# Patient Record
(demographics unavailable — no encounter records)

---

## 2018-05-24 RX ORDER — METFORMIN HCL 500 MG
1000 TABLET, EXTENDED RELEASE 24 HR ORAL 2 TIMES DAILY WITH MEALS
Qty: 120 TABLET | Status: CANCELLED | OUTPATIENT
Start: 2018-05-24

## 2018-05-24 NOTE — TELEPHONE ENCOUNTER
Patient has blank chart in Epic, with no updates available or information to reconcile. Patient has no chart in [facility].  Iris Parsons RN .............. 5/24/2018  4:34 PM

## 2018-05-31 RX ORDER — METFORMIN HCL 500 MG
1000 TABLET, EXTENDED RELEASE 24 HR ORAL
COMMUNITY
Start: 2018-05-26

## 2018-05-31 NOTE — TELEPHONE ENCOUNTER
Metformin failed [facility] refill protocol due to no LDL or Microalbumin on file in the past 12 months and no BP, A1C or OV in the past 6 months.    Also, Patient has no PCP listed and appears to be going to [facility] for care.     Rx refused at this time. Called [facility] pharmacy and spoke with Erasmo, after verifying Patient's last name and . He states this request was originally from [id number] in [place]. Their phone # is [phone]. From what he can see, this has been addressed.    Iris Parsons RN .............. 2018  8:21 AM

## 2018-05-31 NOTE — TELEPHONE ENCOUNTER
"Response to INC [id number]:    \"The reason the patient chart history from [facility] [employer] are not showing in [facility] system is because \"the most recent history in the patient chart dated 5/24/18 is from the [facility] system, and we did not load the [facility] history information so that more recent data did not get overwritten. If you need to see more information about the patient, you can import the data using the [facility] activity option in Epic or you can put in a request to have the patient history from [facility] added to this patient.\"    [facility] update requested, but all information is from [facility]. Metformin and allergies added in from [facility].    Iris Parsons RN .............. 5/31/2018  8:15 AM  "